# Patient Record
Sex: MALE | Race: OTHER | Employment: PART TIME | URBAN - METROPOLITAN AREA
[De-identification: names, ages, dates, MRNs, and addresses within clinical notes are randomized per-mention and may not be internally consistent; named-entity substitution may affect disease eponyms.]

---

## 2017-09-04 ENCOUNTER — HOSPITAL ENCOUNTER (EMERGENCY)
Age: 32
Discharge: HOME | End: 2017-09-04
Attending: EMERGENCY MEDICINE
Payer: SELF-PAY

## 2017-09-04 VITALS
BODY MASS INDEX: 28.25 KG/M2 | SYSTOLIC BLOOD PRESSURE: 130 MMHG | DIASTOLIC BLOOD PRESSURE: 80 MMHG | TEMPERATURE: 98.4 F | WEIGHT: 180 LBS | OXYGEN SATURATION: 96 % | RESPIRATION RATE: 16 BRPM | HEART RATE: 80 BPM | HEIGHT: 67 IN

## 2017-09-04 DIAGNOSIS — T78.40XA ACUTE ALLERGIC REACTION, INITIAL ENCOUNTER: Primary | ICD-10-CM

## 2017-09-04 PROCEDURE — 74011250637 HC RX REV CODE- 250/637: Performed by: EMERGENCY MEDICINE

## 2017-09-04 PROCEDURE — 99283 EMERGENCY DEPT VISIT LOW MDM: CPT | Performed by: EMERGENCY MEDICINE

## 2017-09-04 PROCEDURE — 74011250636 HC RX REV CODE- 250/636: Performed by: EMERGENCY MEDICINE

## 2017-09-04 PROCEDURE — 96372 THER/PROPH/DIAG INJ SC/IM: CPT | Performed by: EMERGENCY MEDICINE

## 2017-09-04 RX ORDER — EPINEPHRINE 0.3 MG/.3ML
0.3 INJECTION SUBCUTANEOUS
COMMUNITY

## 2017-09-04 RX ORDER — HYDROXYZINE PAMOATE 25 MG/1
25 CAPSULE ORAL
Qty: 19 CAP | Refills: 0 | Status: SHIPPED | OUTPATIENT
Start: 2017-09-04 | End: 2017-09-11

## 2017-09-04 RX ORDER — PREDNISONE 20 MG/1
40 TABLET ORAL DAILY
Qty: 10 TAB | Refills: 0 | Status: SHIPPED | OUTPATIENT
Start: 2017-09-04 | End: 2017-09-09

## 2017-09-04 RX ORDER — FAMOTIDINE 20 MG/1
40 TABLET, FILM COATED ORAL
Status: COMPLETED | OUTPATIENT
Start: 2017-09-04 | End: 2017-09-04

## 2017-09-04 RX ORDER — DEXAMETHASONE SODIUM PHOSPHATE 100 MG/10ML
10 INJECTION INTRAMUSCULAR; INTRAVENOUS
Status: COMPLETED | OUTPATIENT
Start: 2017-09-04 | End: 2017-09-04

## 2017-09-04 RX ORDER — HYDROXYZINE PAMOATE 25 MG/1
50 CAPSULE ORAL
Status: COMPLETED | OUTPATIENT
Start: 2017-09-04 | End: 2017-09-04

## 2017-09-04 RX ORDER — FAMOTIDINE 20 MG/1
20 TABLET, FILM COATED ORAL DAILY
Qty: 31 TAB | Refills: 0 | Status: SHIPPED | OUTPATIENT
Start: 2017-09-04 | End: 2017-10-05

## 2017-09-04 RX ORDER — PREDNISONE 5 MG/1
TABLET ORAL
COMMUNITY
End: 2017-09-04

## 2017-09-04 RX ADMIN — HYDROXYZINE PAMOATE 50 MG: 25 CAPSULE ORAL at 18:25

## 2017-09-04 RX ADMIN — DEXAMETHASONE SODIUM PHOSPHATE 10 MG: 10 INJECTION INTRAMUSCULAR; INTRAVENOUS at 18:24

## 2017-09-04 RX ADMIN — FAMOTIDINE 40 MG: 20 TABLET ORAL at 18:25

## 2017-09-04 NOTE — ED PROVIDER NOTES
HPI Comments: 66-year-old male presents 1 day after multiple and bites  Complaining of rash, itching and swelling  Also, some subjective shortness of breath. Patient went to the [facility] today and was prescribed Prednisone 20mg with a taper and an EpiPen  Patient and family were unsure of when to use the EpiPen and due to her symptoms getting worse. He came to the ER. Patient is a 28 y.o. male presenting with Insect Bite and allergic reaction. The history is provided by the patient and the spouse. Insect Bite   This is a new problem. The current episode started yesterday. The problem occurs constantly. The problem has been gradually worsening. Associated symptoms include shortness of breath. Pertinent negatives include no chest pain, no abdominal pain and no headaches. The symptoms are aggravated by exertion. Nothing relieves the symptoms. Allergic Reaction    This is a recurrent problem. The current episode started yesterday. The problem has been gradually worsening. Ingested substance: no known ingestions. Associated symptoms include shortness of breath. Pertinent negatives include no unusual behavior, no walking unsteadily, no nausea, no vomiting and no confusion. His past medical history does not include psychosis or substance abuse. Past Medical History:   Diagnosis Date    Muscle pain     Overweight        History reviewed. No pertinent surgical history. History reviewed. No pertinent family history. Social History     Social History    Marital status:      Spouse name: N/A    Number of children: N/A    Years of education: N/A     Occupational History    Not on file.      Social History Main Topics    Smoking status: Never Smoker    Smokeless tobacco: Never Used    Alcohol use No    Drug use: No    Sexual activity: Not on file     Other Topics Concern    Not on file     Social History Narrative         ALLERGIES: Review of patient's allergies indicates no known allergies. Review of Systems   Constitutional: Negative for activity change, chills, diaphoresis and fever. HENT: Negative for dental problem, hearing loss, nosebleeds, rhinorrhea and sore throat. Eyes: Negative for pain, discharge, redness and visual disturbance. Respiratory: Positive for shortness of breath. Negative for cough and chest tightness. Cardiovascular: Negative for chest pain, palpitations and leg swelling. Gastrointestinal: Negative for abdominal pain, constipation, diarrhea, nausea and vomiting. Endocrine: Negative for cold intolerance, heat intolerance, polydipsia and polyuria. Genitourinary: Negative for dysuria and flank pain. Musculoskeletal: Negative for arthralgias, back pain, joint swelling, myalgias and neck pain. Skin: Positive for rash. Negative for pallor. Allergic/Immunologic: Negative for environmental allergies and food allergies. Neurological: Negative for dizziness, tremors, light-headedness, numbness and headaches. Hematological: Negative for adenopathy. Does not bruise/bleed easily. Psychiatric/Behavioral: Negative for confusion, dysphoric mood and substance abuse. The patient is nervous/anxious. The patient is not hyperactive. All other systems reviewed and are negative. Vitals:    09/04/17 1752   BP: 132/83   Pulse: 84   Resp: 16   Temp: 98.4 °F (36.9 °C)   SpO2: 94%   Weight: 81.6 kg (180 lb)   Height: 5' 6.93\" (1.7 m)            Physical Exam   Constitutional: He is oriented to person, place, and time. He appears well-developed and well-nourished. He appears distressed. HENT:   Head: Normocephalic and atraumatic. Mouth/Throat: Uvula is midline, oropharynx is clear and moist and mucous membranes are normal.   Eyes: Conjunctivae and EOM are normal. Pupils are equal, round, and reactive to light. Right eye exhibits no discharge. Left eye exhibits no discharge. No scleral icterus. Neck: Normal range of motion.  Neck supple. No JVD present. Cardiovascular: Normal rate, regular rhythm, normal heart sounds and intact distal pulses. Exam reveals no gallop and no friction rub. No murmur heard. Pulmonary/Chest: Effort normal and breath sounds normal. No accessory muscle usage. No respiratory distress. He has no decreased breath sounds. He has no wheezes. He has no rhonchi. He has no rales. Abdominal: Soft. Bowel sounds are normal. He exhibits no distension. There is no tenderness. There is no rebound and no guarding. Musculoskeletal: Normal range of motion. He exhibits no edema or tenderness. Lymphadenopathy:     He has no cervical adenopathy. Neurological: He is alert and oriented to person, place, and time. He has normal strength. No cranial nerve deficit or sensory deficit. He exhibits normal muscle tone. GCS eye subscore is 4. GCS verbal subscore is 5. GCS motor subscore is 6. Skin: Skin is warm, dry and intact. Rash noted. Rash is urticarial. He is not diaphoretic. No erythema. No pallor. Diffuse urticarial rash  No ulcerations  No other open wounds   Psychiatric: He has a normal mood and affect. His speech is normal and behavior is normal. Judgment and thought content normal. Cognition and memory are normal.   Nursing note and vitals reviewed. MDM  Number of Diagnoses or Management Options  Acute allergic reaction, initial encounter: new and does not require workup  Diagnosis management comments: 29-year-old male with diffuse urticarial rash secondary to and bites. Patient given 20 mg of prednisone by prescription today without relief  I will go ahead and give Decadron, Vistaril, Pepcid    I have Given verbal instructions to the patient and family members for use of the EpiPen. Specifically that if the patient uses the EpiPen, they should come to the ER for evaluation.        Amount and/or Complexity of Data Reviewed  Tests in the medicine section of CPT®: ordered and reviewed  Review and summarize past medical records: yes    Risk of Complications, Morbidity, and/or Mortality  Presenting problems: moderate  Diagnostic procedures: low  Management options: moderate  General comments: Elements of this note have been dictated via voice recognition software. Text and phrases may be limited by the accuracy of the software. The chart has been reviewed, but errors may still be present.       Patient Progress  Patient progress: improved    ED Course       Procedures

## 2017-09-04 NOTE — ED NOTES
Pt. States that he was bit by what he thinks were ants but now has a rash on his neck all the way down to his legs. Skin is raised red with obvious irritation. Pt states he was bit last night but did not start itching until 3am this morning. Pt  denies SOB or chest pain. Family at bedside.

## 2017-09-04 NOTE — ED TRIAGE NOTES
Reports has hives. States is allergic to bees and ants. Yesterday was bit by an ant. States was given medication today and continues to have more hives in his head. States his hearing in his ear is affected as well. Patient family presented prednisone and an epipen. Patient unsure if he should use his epipen because he was afraid it would interact with the prednisone.

## 2017-09-05 NOTE — ED NOTES
Pt discharged instructions given pt v\u to instructions pt in no acute distress at discharge. Pt ambulatory on discharge.

## 2018-09-20 ENCOUNTER — HOSPITAL ENCOUNTER (EMERGENCY)
Age: 33
Discharge: HOME | End: 2018-09-20
Attending: EMERGENCY MEDICINE
Payer: SELF-PAY

## 2018-09-20 VITALS
RESPIRATION RATE: 16 BRPM | BODY MASS INDEX: 50.62 KG/M2 | HEART RATE: 84 BPM | SYSTOLIC BLOOD PRESSURE: 142 MMHG | HEIGHT: 66 IN | DIASTOLIC BLOOD PRESSURE: 95 MMHG | OXYGEN SATURATION: 100 % | TEMPERATURE: 98 F | WEIGHT: 315 LBS

## 2018-09-20 DIAGNOSIS — T63.461A YELLOW JACKET STING, ACCIDENTAL OR UNINTENTIONAL, INITIAL ENCOUNTER: Primary | ICD-10-CM

## 2018-09-20 LAB
ATRIAL RATE: 97 BPM
CALCULATED P AXIS, ECG09: 68 DEGREES
CALCULATED R AXIS, ECG10: 42 DEGREES
CALCULATED T AXIS, ECG11: 36 DEGREES
DIAGNOSIS, 93000: NORMAL
P-R INTERVAL, ECG05: 146 MS
Q-T INTERVAL, ECG07: 338 MS
QRS DURATION, ECG06: 78 MS
QTC CALCULATION (BEZET), ECG08: 429 MS
VENTRICULAR RATE, ECG03: 97 BPM

## 2018-09-20 PROCEDURE — 96361 HYDRATE IV INFUSION ADD-ON: CPT | Performed by: EMERGENCY MEDICINE

## 2018-09-20 PROCEDURE — 96374 THER/PROPH/DIAG INJ IV PUSH: CPT | Performed by: EMERGENCY MEDICINE

## 2018-09-20 PROCEDURE — 99284 EMERGENCY DEPT VISIT MOD MDM: CPT | Performed by: EMERGENCY MEDICINE

## 2018-09-20 PROCEDURE — 74011250636 HC RX REV CODE- 250/636: Performed by: EMERGENCY MEDICINE

## 2018-09-20 PROCEDURE — 93005 ELECTROCARDIOGRAM TRACING: CPT | Performed by: EMERGENCY MEDICINE

## 2018-09-20 PROCEDURE — 96375 TX/PRO/DX INJ NEW DRUG ADDON: CPT | Performed by: EMERGENCY MEDICINE

## 2018-09-20 RX ORDER — FAMOTIDINE 10 MG/ML
20 INJECTION INTRAVENOUS
Status: COMPLETED | OUTPATIENT
Start: 2018-09-20 | End: 2018-09-20

## 2018-09-20 RX ORDER — DIPHENHYDRAMINE HYDROCHLORIDE 50 MG/ML
25 INJECTION, SOLUTION INTRAMUSCULAR; INTRAVENOUS
Status: COMPLETED | OUTPATIENT
Start: 2018-09-20 | End: 2018-09-20

## 2018-09-20 RX ORDER — DEXAMETHASONE SODIUM PHOSPHATE 100 MG/10ML
10 INJECTION INTRAMUSCULAR; INTRAVENOUS
Status: COMPLETED | OUTPATIENT
Start: 2018-09-20 | End: 2018-09-20

## 2018-09-20 RX ORDER — EPINEPHRINE 0.3 MG/.3ML
0.3 INJECTION SUBCUTANEOUS
Qty: 2 SYRINGE | Refills: 0 | Status: SHIPPED | OUTPATIENT
Start: 2018-09-20 | End: 2018-09-20

## 2018-09-20 RX ADMIN — SODIUM CHLORIDE 1000 ML: 900 INJECTION, SOLUTION INTRAVENOUS at 16:39

## 2018-09-20 RX ADMIN — DEXAMETHASONE SODIUM PHOSPHATE 10 MG: 10 INJECTION INTRAMUSCULAR; INTRAVENOUS at 16:39

## 2018-09-20 RX ADMIN — DIPHENHYDRAMINE HYDROCHLORIDE 25 MG: 50 INJECTION, SOLUTION INTRAMUSCULAR; INTRAVENOUS at 16:39

## 2018-09-20 RX ADMIN — FAMOTIDINE 20 MG: 10 INJECTION, SOLUTION INTRAVENOUS at 16:39

## 2018-09-20 NOTE — ED PROVIDER NOTES
HPI Comments: Stung to left hand approx index MCP dorsally. In ER continues to improve beyondinitial in ER after EPI. Had auto injector but did not use(given in ER). No wheezing. In past with hives with sting but not this time Patient is a 35 y.o. male presenting with allergic reaction. The history is provided by the patient. Allergic Reaction This is a new problem. The current episode started less than 1 hour ago. Ingested substance: yellowjacket sting to his left hand and possibly left forearm. Past Medical History:  
Diagnosis Date  Muscle pain  Overweight No past surgical history on file. No family history on file. Social History Social History  Marital status:  Spouse name: N/A  
 Number of children: N/A  
 Years of education: N/A Occupational History  Not on file. Social History Main Topics  Smoking status: Never Smoker  Smokeless tobacco: Never Used  Alcohol use No  
 Drug use: No  
 Sexual activity: Not on file Other Topics Concern  Not on file Social History Narrative ALLERGIES: Review of patient's allergies indicates no known allergies. Review of Systems Respiratory: Negative. Negative for wheezing and stridor. Gastrointestinal: Negative. All other systems reviewed and are negative. Vitals:  
 09/20/18 1621 BP: 119/80 Pulse: (!) 116 Resp: 16 Temp: 98 °F (36.7 °C) SpO2: 98% Weight: (!) 453.1 kg (999 lb) Height: 5' 6\" (1.676 m) Physical Exam  
Constitutional: He appears well-developed and well-nourished. No distress. HENT:  
Head: Atraumatic. Eyes: No scleral icterus. Neck: Neck supple. Cardiovascular: Normal rate. Pulmonary/Chest: Effort normal. No respiratory distress. He has no wheezes. Abdominal: Soft. Neurological: He is alert. Skin: Skin is warm and dry. No hives Psychiatric: His behavior is normal. Thought content normal.  
 Nursing note and vitals reviewed. MDM Number of Diagnoses or Management Options Yellow jacket sting, accidental or unintentional, initial encounter:  
Diagnosis management comments: Bee sting with +/- earlygeneral rxn that stops with epi Amount and/or Complexity of Data Reviewed Obtain history from someone other than the patient: yes Risk of Complications, Morbidity, and/or Mortality Presenting problems: moderate Management options: moderate Patient Progress Patient progress: resolved (Only hand pain at discharge) ED Course Procedures

## 2018-09-20 NOTE — ED NOTES
I have reviewed discharge instructions with the patient and spouse. The patient and spouse verbalized understanding. Patient left ED via Discharge Method: ambulatory to Home with transport from wife. The patient is ambulatory upon exit and appears in no acute distress. The patient has been provided discharge instructions, prescription, and follow up information. The patient and wife do not have any questions at this time. Opportunity for questions and clarification provided. Patient given 1 scripts. To continue your aftercare when you leave the hospital, you may receive an automated call from our care team to check in on how you are doing. This is a free service and part of our promise to provide the best care and service to meet your aftercare needs.  If you have questions, or wish to unsubscribe from this service please call [phone]. Thank you for Choosing our [facility] Emergency Department.

## 2018-09-20 NOTE — ED TRIAGE NOTES
Pt arrives with complaints of allergic reaction to yellow jackets. Got stung on left hand. Pt has epi pen with him but did not take it just came right to ER. Pt given his home epi pen on arrival to triage. Pt with swelling to left hand. Holding chest. Unable to fully understand as pt is [place] speaking. Calling .

## 2018-09-20 NOTE — DISCHARGE INSTRUCTIONS
Picaduras de insectos: Instrucciones de cuidado - [ Insect Stings and Bites: Care Instructions ]  Instrucciones de cuidado  Las picaduras de [place], [place], hormigas y otros insectos suelen causar dolor, hinchazón, enrojecimiento y comezón. Para algunas personas, [employer], [place] enrojecimiento y la hinchazón pueden ser peores. Pueden extenderse varias pulgadas más allá de la antoni afectada. [place] en la mayoría de los [place], las picaduras no causan reacciones en todo el cuerpo. Si nicholas tenido [place] reacción a la picadura de un insecto, usted está en riesgo de otra reacción si lo vuelven a picar. La atención de seguimiento es lobito parte clave de alvarez tratamiento y seguridad. Asegúrese de hacer y acudir a todas las citas, y llame a alvarez médico si está teniendo problemas. También es lobito buena idea saber los resultados de anne exámenes y mantener lobito lista de los medicamentos que priscilla. ¿Cómo puede cuidarse en el hogar? · No se rasque ni frote la piel donde ocurrió la picadura. · [place] lobito compresa fría o un cubito de hielo sobre la antoni. Póngase un paño sierra entre el hielo y la piel. En algunas personas, lobito pasta de bicarbonato de sodio con un poco de agua ayuda a aliviar el dolor y reducir la reacción. · [place] un antihistamínico de venta [place], martha difenhidramina (Benadryl) o loratadina (Claritin), para aliviar la hinchazón, el enrojecimiento y la comezón. Lobito loción de calamina o crema de hidrocortisona también podría ayudar. No le dé antihistamínicos a alvarez hijo sin hablar elliott con alvarez médico.  · [place] z[place] con los medicamentos. Si alvarez médico le recetó un medicamento para tratar la alergia, tómelo exactamente según las indicaciones. Llame a alvarez médico si teresa estar teniendo problemas con alvarez medicamento.  [place] [employer] medicamentos específicos recetados por alvarez médico.  · Alvarez médico podría recetarle lobito inyección de epinefrina para que lleve consigo en queta de que tenga [place] reacción grave. [place] a inyectarse usted mismo y en qué momento, y lleve la inyección consigo todo el [place]. Asegúrese de que no haya caducado. · Vaya a la prerna de urgencias cada vez que tenga lobito reacción grave. [place] si ya se nicholas administrado la inyección de epinefrina y se siente mejor. Los síntomas pueden reaparecer. ¿Cuándo debe pedir ayuda? Llame al 911 en cualquier momento que considere que necesita atención de [place]. Por ejemplo, llame si:    · Tiene síntomas de lobito reacción alérgica grave. Estos podrían incluir:  ¨ Zonas elevadas y rojizas (ronchas) por todo el cuerpo que se presentan de repente. ¨ Hinchazón de la garganta, la boca, los labios o la [place]. ¨ Dificultades para respirar. ¨ Pérdida del conocimiento (desmayo). O puede sentirse muy mareado o de repente sentirse débil, confuso o inquieto.    Llame a alvarez médico ahora mismo o busque atención médica inmediata si:    · Tiene síntomas de lobito reacción alérgica en un lugar diferente a la antoni de la picadura, tales martha:  ¨ Un salpullido o lobito pequeña antoni con ronchas (zonas elevadas y enrojecidas en la piel). ¨ Comezón. ¨ Hinchazón. ¨ Dolor abdominal, náuseas o vómitos.     · Presenta lobito hinchazón considerable alrededor del sitio (por ejemplo, tiene hinchazón en todo el brazo o la pierna).     · Tiene señales de infección, tales martha:  ¨ Aumento del dolor, la hinchazón, el enrojecimiento o la temperatura alrededor de la picadura. ¨ Vetas rojizas que comienzan en la antoni. ¨ Pus que sale de la picadura. [place] especial atención a los cambios en alvarez rosales y asegúrese de comunicarse con alvarez médico si:    · No mejora martha se esperaba. ¿Dónde puede encontrar más información en inglés? Meredith Noss a http://oren-munir.info/. Escriba [id number] en la búsqueda para aprender más acerca de \"Picaduras de insectos: Instrucciones de cuidado - [ Insect Stings and Bites: Care Instructions ]. \"  Revisado: 20 noviembre, 2017  Versión del contenido: 11.7  © 2262-2637 Healthwise, Incorporated. Las instrucciones de cuidado fueron adaptadas bajo licencia por [employer] (which disclaims liability or warranty for this information). Si usted tiene [place] afección médica o sobre estas instrucciones, siempre pregunte a alvarez profesional de rosales. Healthwise, Incorporated niega toda garantía o responsabilidad por alvarez uso de esta información. Reacción alérgica: Instrucciones de cuidado - [ Allergic Reaction: Care Instructions ]  Instrucciones de cuidado    [facility] reacción alérgica es lobito respuesta excesiva del sistema inmunitario a un medicamento, lobito sustancia química, un alimento, [place] de insecto u otra sustancia. [facility] reacción puede variar desde leve hasta potencialmente mortal. Algunas personas presentan salpullido leve, urticaria (ronchas) y comezón o retortijones. Cuando las reacciones son graves, la hinchazón de la lengua y la garganta puede obstruir las [place]. La atención de seguimiento es lobito parte clave de alvarez tratamiento y seguridad. Asegúrese de hacer y acudir a todas las citas, y llame a alvarez médico si está teniendo problemas. También es lobito buena idea saber los resultados de anne exámenes y mantener lobito lista de los medicamentos que priscilla. ¿Cómo puede cuidarse en el hogar? · Si sabe qué causó alvarez reacción alérgica, asegúrese de evitarlo. Alvarez alergia podría empeorar cada vez que tenga lobito reacción. · [place] un antihistamínico de venta [place], martha cetirizina (Zyrtec) o loratadina (Claritin), para tratar los síntomas leves. [place] y siga las indicaciones de la etiqueta. Algunos antihistamínicos pueden causar somnolencia (sueño).  No le dé antihistamínicos a un bia sin hablar elliott con alvarez médico. Los síntomas leves incluyen estornudos o comezón o goteo nasal, comezón en la boca, algunas ronchas (urticaria) o comezón leve y náuseas leves o malestar estomacal.  · No se rasque las ronchas ni el salpullido. Póngase lobito toalla húmeda fría sobre la antoni afectada o dese pattie fríos para aliviar la comezón. Póngase compresas de [employer], la hinchazón o las picaduras de insectos por entre 10 y 15 minutos cada vez. Póngase un paño sierra entre el hielo y la piel. No se dé pattie ni duchas calientes. [place] la comezón. · Es posible que alvarez médico le recete lobito inyección de epinefrina para que la lleve consigo en queta de que tenga lobito reacción grave. [place] a aplicarse la inyección usted mismo y llévela consigo todo el [place]. Asegúrese de que no haya caducado. · Vaya a la prerna de urgencias cada vez que tenga lobito reacción grave, incluso si ya se ha administrado la inyección de epinefrina y se siente mejor. Los síntomas pueden reaparecer después de aplicarse la inyección. · Use un brazalete o collar de alerta médica que tenga lobito lista de anne [place]. Estos productos pueden comprarse en la mayoría de las [facility]. · Si alvarez hijo tiene [place] grave, asegúrese de que anne profesores, niñeras, entrenadores y otras personas encargadas de alvarez cuidado sepan acerca de la alergia. Deben tener lobito inyección de epinefrina, saber cómo y cuándo administrársela, y tener un plan para llevar a alvarez hijo al hospital.  ¿Cuándo debe pedir ayuda? [place] lobito inyección de epinefrina si:    · Piensa que está teniendo lobito reacción alérgica grave.     · Tiene síntomas en más de lobito antoni del cuerpo, martha náuseas leves y comezón en la boca.    Después de aplicarse lobito inyección de epinefrina, llame al 911 incluso si se siente mejor.   Llame al 911 si:    · Tiene síntomas de lobito reacción alérgica grave. Estos pueden incluir:  ¨ Zonas abultadas y enrojecidas (ronchas) que aparecen repentinamente por todo el cuerpo. ¨ Hinchazón de la garganta, la boca, los labios o la [place]. ¨ Dificultad para respirar. ¨ Pérdida del conocimiento (desmayo).  O podría sentirse muy aturdido o de repente sentirse débil, confuso o agitado.     · Le alvarenga aplicado lobito inyección de epinefrina, incluso si se siente mejor.    Llame a alvarez médico ahora mismo o busque atención médica inmediata si:    · Tiene síntomas de lobito reacción alérgica, tales martha:  ¨ Salpullido o ronchas (zonas abultadas y enrojecidas en la piel). ¨ Comezón. ¨ Hinchazón. ¨ Dolor abdominal, náuseas o vómito.    [place] especial atención a los cambios en alvarez rosales y asegúrese de comunicarse con alvarez médico si:    · No mejora martha se esperaba. ¿Dónde puede encontrar más información en inglés? Letty Calixto a http://oren-munir.info/. Mary Lou Pickett [phone] en la búsqueda para aprender más acerca de \"Reacción alérgica: Instrucciones de cuidado - [ Allergic Reaction: Care Instructions ]. \"  Revisado: 6 octubre, 2017  Versión del contenido: 11.7  © 5032-0719 Healthwise, Incorporated. Las instrucciones de cuidado fueron adaptadas bajo licencia por [employer] (which disclaims liability or warranty for this information). Si usted tiene [place] afección médica o sobre estas instrucciones, siempre pregunte a alvarez profesional de rosales. Healthwise, Incorporated niega toda garantía o responsabilidad por alvarez uso de esta información.

## 2019-08-09 NOTE — DISCHARGE INSTRUCTIONS
Reacción alérgica: Instrucciones de cuidado - [ Allergic Reaction: Care Instructions ]  Instrucciones de cuidado  Edgardo Paulino reacción alérgica es lobito respuesta excesiva del sistema inmunitario a un medicamento, lobito sustancia química, un alimento, [place] de insecto u otra sustancia. Willbryan Paulino reacción puede variar desde leve hasta potencialmente mortal. Algunas personas presentan salpullido leve, urticaria (ronchas) y comezón o retortijones. Cuando las reacciones son graves, la hinchazón de la lengua y la garganta puede obstruir las Josephus Schiller. La atención de seguimiento es lobito parte clave de alvarez tratamiento y seguridad. Asegúrese de hacer y acudir a todas las citas, y llame a alvarez médico si está teniendo problemas. También es lobito buena idea saber los resultados de anne exámenes y mantener lobito lista de los medicamentos que priscilla. ¿Cómo puede cuidarse en el hogar? · Si sabe qué causó alvarez reacción alérgica, asegúrese de evitarlo. Alvarez alergia podría empeorar cada vez que tenga lobito reacción. · Holiday un antihistamínico de andrea Mahamed, martha cetirizina (Zyrtec) o loratadina (Claritin), para tratar los síntomas leves. Ronit y siga las indicaciones de la etiqueta. Algunos antihistamínicos pueden causar somnolencia (sueño). No le dé antihistamínicos a un bia sin hablar elliott con alvarez médico. Los síntomas leves incluyen estornudos o comezón o goteo nasal, comezón en la boca, algunas ronchas (urticaria) o comezón leve y náuseas leves o malestar estomacal.  · No se rasque las ronchas ni el salpullido. Póngase lobito toalla húmeda fría sobre la antoni afectada o dese pattie fríos para aliviar la comezón. Póngase compresas de [employer], la hinchazón o las picaduras de insectos por entre 10 y 15 minutos cada vez. Póngase un paño sierra entre el hielo y la piel. No se dé pattie ni duchas calientes. Empeorarán la comezón.   · Es posible que alvarez médico le recete lobito inyección de epinefrina para que la lleve consigo en queta de que tenga lobito reacción grave. Aprenda a aplicarse la inyección usted mismo y llévela consigo todo el [place]. Asegúrese de que no haya caducado. · Vaya a la prerna de urgencias cada vez que tenga lobito reacción grave, incluso si ya se ha administrado la inyección de epinefrina y se siente mejor. Los síntomas pueden reaparecer después de aplicarse la inyección. · Use un brazalete o collar de alerta médica que tenga lobito lista de anne [place]. Estos productos pueden comprarse en la mayoría de las [facility]. · Si alvarez hijo tiene [place] grave, asegúrese de que anne profesores, niñeras, entrenadores y otras personas encargadas de alvarez cuidado sepan acerca de la alergia. Deben tener lobito inyección de epinefrina, saber cómo y cuándo administrársela, y tener un plan para llevar a alvarez hijo al hospital.  ¿Cuándo debe pedir ayuda? Aplíquese lobito inyección de epinefrina si:  · Piensa que está teniendo lobito reacción alérgica grave. · Tiene síntomas en más de lobito antoni del cuerpo, martha náuseas leves y comezón en la boca. Después de aplicarse lobito inyección de epinefrina, llame al 911 incluso si se siente mejor. Llame al 911 si:  · Tiene síntomas de lobito reacción alérgica grave. Estos pueden incluir:  ¨ Zonas abultadas y enrojecidas (ronchas) que aparecen repentinamente por todo el cuerpo. ¨ Hinchazón de la garganta, la boca, los labios o la [place]. ¨ Dificultad para respirar. ¨ Pérdida del conocimiento (desmayo). O podría sentirse muy aturdido o de repente sentirse débil, confuso o agitado. · Le alvarenga aplicado lobito inyección de epinefrina, incluso si se siente mejor. Llame a alvarez médico ahora mismo o busque atención médica inmediata si:  · Tiene síntomas de lobito reacción alérgica, tales martha:  ¨ Salpullido o ronchas (zonas abultadas y enrojecidas en la piel). ¨ Comezón. ¨ Hinchazón. ¨ Dolor abdominal, náuseas o vómito.   Preste especial atención a los cambios en alvarez rosales y asegúrese de comunicarse con alvarez médico si:  · No mejora martha se esperaba. ¿Dónde puede encontrar más información en inglés? Mirta Reyes a [email or web address] Lisandra Mchugh [id number] en la búsqueda para aprender más acerca de \"Reacción alérgica: Instrucciones de cuidado - [ Allergic Reaction: Care Instructions ]. \"  Revisado: 3 toy, 2017  Versión del contenido: 11.3  © 2432-5844 [employer]. Las instrucciones de cuidado fueron adaptadas bajo licencia por [employer] (which disclaims liability or warranty for this information). Si usted tiene [place] afección médica o sobre estas instrucciones, siempre pregunte a alvarez profesional de rosales. [employer] niega toda garantía o responsabilidad por alvarez uso de esta información. caffeine